# Patient Record
Sex: FEMALE | Race: WHITE | NOT HISPANIC OR LATINO | Employment: UNEMPLOYED | URBAN - METROPOLITAN AREA
[De-identification: names, ages, dates, MRNs, and addresses within clinical notes are randomized per-mention and may not be internally consistent; named-entity substitution may affect disease eponyms.]

---

## 2022-11-18 ENCOUNTER — OFFICE VISIT (OUTPATIENT)
Dept: ENDOCRINOLOGY | Facility: HOSPITAL | Age: 32
End: 2022-11-18

## 2022-11-18 ENCOUNTER — APPOINTMENT (OUTPATIENT)
Dept: LAB | Facility: HOSPITAL | Age: 32
End: 2022-11-18

## 2022-11-18 VITALS
HEART RATE: 96 BPM | HEIGHT: 63 IN | WEIGHT: 134 LBS | BODY MASS INDEX: 23.74 KG/M2 | DIASTOLIC BLOOD PRESSURE: 80 MMHG | SYSTOLIC BLOOD PRESSURE: 120 MMHG

## 2022-11-18 DIAGNOSIS — E10.65 TYPE 1 DIABETES MELLITUS WITH HYPERGLYCEMIA (HCC): Primary | ICD-10-CM

## 2022-11-18 PROBLEM — R80.9 URINE TEST POSITIVE FOR MICROALBUMINURIA: Status: ACTIVE | Noted: 2022-11-18

## 2022-11-18 LAB
ALBUMIN SERPL BCP-MCNC: 4.4 G/DL (ref 3.5–5)
ALP SERPL-CCNC: 52 U/L (ref 46–116)
ALT SERPL W P-5'-P-CCNC: 17 U/L (ref 12–78)
ANION GAP SERPL CALCULATED.3IONS-SCNC: 3 MMOL/L (ref 4–13)
AST SERPL W P-5'-P-CCNC: 11 U/L (ref 5–45)
BASOPHILS # BLD AUTO: 0.03 THOUSANDS/ÂΜL (ref 0–0.1)
BASOPHILS NFR BLD AUTO: 1 % (ref 0–1)
BILIRUB SERPL-MCNC: 0.94 MG/DL (ref 0.2–1)
BUN SERPL-MCNC: 8 MG/DL (ref 5–25)
CALCIUM SERPL-MCNC: 9.6 MG/DL (ref 8.3–10.1)
CHLORIDE SERPL-SCNC: 104 MMOL/L (ref 96–108)
CHOLEST SERPL-MCNC: 194 MG/DL
CO2 SERPL-SCNC: 29 MMOL/L (ref 21–32)
CREAT SERPL-MCNC: 0.53 MG/DL (ref 0.6–1.3)
EOSINOPHIL # BLD AUTO: 0.06 THOUSAND/ÂΜL (ref 0–0.61)
EOSINOPHIL NFR BLD AUTO: 1 % (ref 0–6)
ERYTHROCYTE [DISTWIDTH] IN BLOOD BY AUTOMATED COUNT: 13 % (ref 11.6–15.1)
GFR SERPL CREATININE-BSD FRML MDRD: 127 ML/MIN/1.73SQ M
GLUCOSE P FAST SERPL-MCNC: 82 MG/DL (ref 65–99)
HCT VFR BLD AUTO: 42.6 % (ref 34.8–46.1)
HDLC SERPL-MCNC: 89 MG/DL
HGB BLD-MCNC: 13.6 G/DL (ref 11.5–15.4)
IMM GRANULOCYTES # BLD AUTO: 0.01 THOUSAND/UL (ref 0–0.2)
IMM GRANULOCYTES NFR BLD AUTO: 0 % (ref 0–2)
LDLC SERPL CALC-MCNC: 93 MG/DL (ref 0–100)
LYMPHOCYTES # BLD AUTO: 1.59 THOUSANDS/ÂΜL (ref 0.6–4.47)
LYMPHOCYTES NFR BLD AUTO: 31 % (ref 14–44)
MCH RBC QN AUTO: 30.4 PG (ref 26.8–34.3)
MCHC RBC AUTO-ENTMCNC: 31.9 G/DL (ref 31.4–37.4)
MCV RBC AUTO: 95 FL (ref 82–98)
MONOCYTES # BLD AUTO: 0.37 THOUSAND/ÂΜL (ref 0.17–1.22)
MONOCYTES NFR BLD AUTO: 7 % (ref 4–12)
NEUTROPHILS # BLD AUTO: 3.08 THOUSANDS/ÂΜL (ref 1.85–7.62)
NEUTS SEG NFR BLD AUTO: 60 % (ref 43–75)
NRBC BLD AUTO-RTO: 0 /100 WBCS
PLATELET # BLD AUTO: 310 THOUSANDS/UL (ref 149–390)
PMV BLD AUTO: 10.2 FL (ref 8.9–12.7)
POTASSIUM SERPL-SCNC: 3.7 MMOL/L (ref 3.5–5.3)
PROT SERPL-MCNC: 8.1 G/DL (ref 6.4–8.4)
RBC # BLD AUTO: 4.47 MILLION/UL (ref 3.81–5.12)
SODIUM SERPL-SCNC: 136 MMOL/L (ref 135–147)
TRIGL SERPL-MCNC: 58 MG/DL
TSH SERPL DL<=0.05 MIU/L-ACNC: 1.37 UIU/ML (ref 0.45–4.5)
WBC # BLD AUTO: 5.14 THOUSAND/UL (ref 4.31–10.16)

## 2022-11-18 RX ORDER — CHLORAL HYDRATE 500 MG
CAPSULE ORAL
COMMUNITY

## 2022-11-18 RX ORDER — INSULIN GLARGINE 100 [IU]/ML
12 INJECTION, SOLUTION SUBCUTANEOUS
Qty: 15 ML | Refills: 3 | Status: SHIPPED | OUTPATIENT
Start: 2022-11-18

## 2022-11-18 RX ORDER — LANCETS 30 GAUGE
EACH MISCELLANEOUS
Qty: 800 EACH | Refills: 3 | Status: SHIPPED | OUTPATIENT
Start: 2022-11-18

## 2022-11-18 RX ORDER — BLOOD SUGAR DIAGNOSTIC
STRIP MISCELLANEOUS
Qty: 800 STRIP | Refills: 3 | Status: SHIPPED | OUTPATIENT
Start: 2022-11-18

## 2022-11-18 RX ORDER — INSULIN LISPRO 100 [IU]/ML
INJECTION, SOLUTION INTRAVENOUS; SUBCUTANEOUS
COMMUNITY
Start: 2003-09-17 | End: 2022-11-18 | Stop reason: SDUPTHER

## 2022-11-18 RX ORDER — PEN NEEDLE, DIABETIC 31 GX5/16"
NEEDLE, DISPOSABLE MISCELLANEOUS
Qty: 500 EACH | Refills: 3 | Status: SHIPPED | OUTPATIENT
Start: 2022-11-18

## 2022-11-18 RX ORDER — INSULIN GLARGINE 100 [IU]/ML
12 INJECTION, SOLUTION SUBCUTANEOUS
COMMUNITY
Start: 2003-09-17 | End: 2022-11-18 | Stop reason: SDUPTHER

## 2022-11-18 RX ORDER — INSULIN LISPRO 100 [IU]/ML
INJECTION, SOLUTION INTRAVENOUS; SUBCUTANEOUS
Qty: 30 ML | Refills: 3 | Status: SHIPPED | OUTPATIENT
Start: 2022-11-18

## 2022-11-18 NOTE — PROGRESS NOTES
11/19/2022    Assessment/Plan      Diagnoses and all orders for this visit:    Type 1 diabetes mellitus with hyperglycemia (HCC)  -     HEMOGLOBIN A1C W/ EAG ESTIMATION Lab Collect  -     CBC and differential Lab Collect  -     Comprehensive metabolic panel Lab Collect  -     Lipid Panel with Direct LDL reflex Lab Collect  -     TSH, 3rd generation Lab Collect  -     HEMOGLOBIN A1C W/ EAG ESTIMATION Lab Collect; Future  -     Comprehensive metabolic panel Lab Collect; Future  -     Microalbumin / creatinine urine ratio Lab Collect; Future  -     Insulin Glargine Solostar 100 UNIT/ML SOPN; Inject 0 12 mL (12 Units total) under the skin daily at bedtime  -     insulin lispro (HumaLOG) 100 units/mL injection; On carb ratio, up to 30 units daily  -     Insulin Pen Needle (B-D UF III MINI PEN NEEDLES) 31G X 5 MM MISC; Use 5 times a day  -     glucose blood (OneTouch Verio) test strip; Use to test up to 8 times a day  -     OneTouch Delica Lancets 60E MISC; Use  8 times a day    Other orders  -     Discontinue: insulin lispro (HumaLOG) 100 units/mL injection; On carb ratio  -     Discontinue: Insulin Glargine Solostar 100 UNIT/ML SOPN; Inject 12 Units under the skin daily at bedtime  -     CINNAMON PO; Take by mouth in the morning  -     APPLE CIDER VINEGAR PO; Take by mouth 2 (two) times a day  -     Probiotic Product (PROBIOTIC ADVANCED PO); Take by mouth in the morning  -     Prenatal Vit-DSS-Fe Cbn-FA (PRENATAL AD PO); Take by mouth in the morning  -     Omega-3 1000 MG CAPS; Take by mouth Prn daily  -     INOSITOL-5 PO; Take by mouth in the morning  -     MISC NATURAL PRODUCTS EX; Apply topically Progesterone cream days near menses  Assessment/Plan:  Type 1 diabetes  I have no recent blood work since March 2022  At that time, her hemoglobin A1c was quite good in the 6s  For now, she will continue the same Lantus and Humalog insulin dosages  I will have her get her blood work done now    I have asked her to make sure she tries to eat something around lunchtime as this will help keep her blood sugars from rising throughout the day  She will continue to test her blood sugars at least 6 times a day  She has a Dexcom G6 and I have asked her to consider using it as she will need it if she becomes pregnant  I have asked her to follow up with the ophthalmologist as she is overdue for diabetic eye exam   She is considering trying to conceive a pregnancy in the next several months and I have asked her to call if this occurs as she will need to be followed by maternal fetal medicine for her diabetes during pregnancy  I have asked her to get blood work done now consisting of a hemoglobin A1c, CMP, CBC, lipid panel, and TSH  I have asked her to follow up in 6 months with preceding hemoglobin A1c, CMP, and urine microalbumin to creatinine ratio  CC: Diabetes Consult    History of Present Illness     HPI: Librado Hendrix is a 29y o  year old female with type 1 diabetes for 20 years for evaluation/consult  She was diagnosed at the age of 10 or 8 years when she developed polyuria and polydipsia  Blood sugar was checked and was in the 200s  She had had a lot of strep throat prior to that as a child  She has never had DKA  She has had a transient hemoglobin A1c in the 8 in adulthood but most of the time her hemoglobin A1cs have been quite good  She lives in [place] and recently moved from [place] and is here for transfer of care  She is on insulin at home and takes Lantus insulin 12 units at bedtime and Humalog insulin 1 unit per 7 g carbohydrate with each meal and extra 1 unit for every 40 blood sugar points above 120-180 depending on her activity  She denies any polyphagia, polydipsia, and blurry vision  She is chronic polyuria and once or twice a night nocturia  She denies numbness or tingling of the feet  She denies chest pain or shortness of breath    She denies neuropathy, nephropathy, retinopathy, heart attack, stroke and claudication but does admit to none  She has been to Diabetes Education in the past   She is not interested in having an insulin pump as she does not want anything attached to her  She has a Dexcom G6 that she is not yet started using  She is hoping to become pregnant in the near future  Hypoglycemic episodes: Yes daily  May be 0-2 times a day but on most days  Usually overnight  H/o of hypoglycemia causing hospitalization or intervention such as glucagon injection  or ambulance call  No   Hypoglycemia symptoms: jitteriness and sweating  Treatment of hypoglycemia: fruit snacks  Glucagon:Yes  Medic alert tag: recommended,Yes  The patient's last eye exam was in July 2021 with perhaps 1 hemorrhage and slight retinopathy  The patient's last foot exam was not recently  Last A1C was   Lab Results   Component Value Date    HGBA1C 6 6 (H) 11/18/2022     Blood Sugar/Glucometer/Pump/CGM review:  She checks her blood sugars at least 6 or more times a day  She sometimes checks up to 10 times a day  She will notice a spike of blood sugars in the late morning if she eats nothing for breakfast   Sugars can go up to 300 from 80  Blood sugars seem to be the highest in the mid afternoon especially if she does not eat lunch  She did not have a blood sugar record with her but reports her 7 day average was 173 although she would just gotten back from vacation  Her 14 day average of blood sugars is 154 and her 30 day average of blood sugars is 151  Review of Systems   Constitutional: Negative for fatigue and unexpected weight change  Weight up a little with the [place] pandemic  HENT: Negative for hearing loss, tinnitus and trouble swallowing  No XRT to the head or neck in the past    Eyes: Negative for visual disturbance  Respiratory: Negative for chest tightness and shortness of breath      Cardiovascular: Negative for chest pain, palpitations and leg swelling  Gastrointestinal: Positive for constipation  Negative for abdominal pain, diarrhea and nausea  Occasional constipation  Was in ED once in the past     Endocrine: Positive for polyuria  Negative for polydipsia and polyphagia  Chronic polyuria  Nocturia once or twice a night  Genitourinary:        Irregular menses  Occurs every 1-2 months  No galactorrhea  History of microabuminuria in the past resolved on own  Musculoskeletal: Negative for arthralgias and back pain  Skin: Negative for wound  Neurological: Negative for dizziness, tremors, weakness, light-headedness, numbness and headaches  Psychiatric/Behavioral: Negative for dysphoric mood and sleep disturbance  The patient is not nervous/anxious  Historical Information   History reviewed  No pertinent past medical history    Past Surgical History:   Procedure Laterality Date   • WISDOM TOOTH EXTRACTION  2010     Social History   Social History     Substance and Sexual Activity   Alcohol Use Not Currently     Social History     Substance and Sexual Activity   Drug Use Never     Social History     Tobacco Use   Smoking Status Never   Smokeless Tobacco Never     Family History:   Family History   Problem Relation Age of Onset   • Hashimoto's thyroiditis Mother    • Hypertension Father    • Diabetes type I Cousin        Meds/Allergies   Current Outpatient Medications   Medication Sig Dispense Refill   • APPLE CIDER VINEGAR PO Take by mouth 2 (two) times a day     • CINNAMON PO Take by mouth in the morning     • glucose blood (OneTouch Verio) test strip Use to test up to 8 times a day 800 strip 3   • INOSITOL-5 PO Take by mouth in the morning     • Insulin Glargine Solostar 100 UNIT/ML SOPN Inject 0 12 mL (12 Units total) under the skin daily at bedtime 15 mL 3   • insulin lispro (HumaLOG) 100 units/mL injection On carb ratio, up to 30 units daily 30 mL 3   • Insulin Pen Needle (B-D UF III MINI PEN NEEDLES) 31G X 5 MM MISC Use 5 times a day 500 each 3   • MISC NATURAL PRODUCTS EX Apply topically Progesterone cream days near menses  • Omega-3 1000 MG CAPS Take by mouth Prn daily     • OneTouch Delica Lancets 64Q MISC Use  8 times a day 800 each 3   • Prenatal Vit-DSS-Fe Cbn-FA (PRENATAL AD PO) Take by mouth in the morning     • Probiotic Product (PROBIOTIC ADVANCED PO) Take by mouth in the morning       No current facility-administered medications for this visit  Allergies   Allergen Reactions   • Penicillins Hives and Rash       Objective   Vitals: Blood pressure 120/80, pulse 96, height 5' 3" (1 6 m), weight 60 8 kg (134 lb)  Invasive Devices     None                 Physical Exam  Vitals reviewed  Constitutional:       Appearance: Normal appearance  She is well-developed, normal weight and well-nourished  HENT:      Head: Normocephalic and atraumatic  Eyes:      Extraocular Movements: Extraocular movements intact and EOM normal       Conjunctiva/sclera: Conjunctivae normal       Comments: No lid lag, stare, proptosis, or periorbital edema  Neck:      Thyroid: No thyromegaly  Vascular: No carotid bruit  Comments: Thyroid normal in size  No palpable thyroid nodules  No bruits over the thyroid gland  Cardiovascular:      Rate and Rhythm: Normal rate and regular rhythm  Pulses: Normal pulses and intact distal pulses  no weak pulses          Dorsalis pedis pulses are 2+ on the right side and 2+ on the left side  Posterior tibial pulses are 2+ on the right side and 2+ on the left side  Heart sounds: Normal heart sounds  No murmur heard  Pulmonary:      Effort: Pulmonary effort is normal       Breath sounds: Normal breath sounds  No wheezing  Abdominal:      General: Bowel sounds are normal       Palpations: Abdomen is soft  Tenderness: There is no abdominal tenderness  Musculoskeletal:         General: No deformity or edema  Normal range of motion        Cervical back: Normal range of motion and neck supple  Right lower leg: No edema  Left lower leg: No edema  Comments: Callus medial 1st toe  No ulcerations of the feet  No tremor of the outstretched hands  No spinous process tenderness  No CVA tenderness  Feet:      Right foot:      Skin integrity: Callus present  No ulcer, skin breakdown, erythema, warmth or dry skin  Left foot:      Skin integrity: Callus present  No ulcer, skin breakdown, erythema, warmth or dry skin  Lymphadenopathy:      Cervical: No cervical adenopathy  Skin:     General: Skin is warm and dry  Findings: No rash  Neurological:      Mental Status: She is alert and oriented to person, place, and time  Deep Tendon Reflexes: Reflexes are normal and symmetric  Comments: Vibration sensation intact to the 1st toe DIP joint bilaterally  Microfilament sensation intact bilaterally  achilles tendon reflexes intact  Patient's shoes and socks removed  Right Foot/Ankle   Right Foot Inspection  Skin Exam: skin normal, skin intact, callus and callus  No dry skin, no warmth, no erythema, no maceration, no abnormal color, no pre-ulcer and no ulcer  Toe Exam: ROM and strength within normal limits  No swelling and  no right toe deformity    Sensory   Vibration: intact  Monofilament testing: intact    Vascular  Capillary refills: < 3 seconds  The right DP pulse is 2+  The right PT pulse is 2+  Left Foot/Ankle  Left Foot Inspection  Skin Exam: skin normal, skin intact and callus  No dry skin, no warmth, no erythema, no maceration, normal color, no pre-ulcer and no ulcer  Toe Exam: ROM and strength within normal limits  No swelling and no left toe deformity  Sensory   Vibration: intact  Monofilament testing: intact    Vascular  Capillary refills: < 3 seconds  The left DP pulse is 2+  The left PT pulse is 2+       Assign Risk Category  No deformity present  No loss of protective sensation  No weak pulses  Risk: 0        The history was obtained from the review of the chart and from the patient  Lab Results: At the time of the visit, I had no blood work results       Most recent Alc is  Lab Results   Component Value Date    HGBA1C 6 6 (H) 11/18/2022                 Future Appointments   Date Time Provider Beverly Byrne   5/19/2023 10:20 AM Miriam Samuel MD [facility]

## 2022-11-18 NOTE — PATIENT INSTRUCTIONS
We'll get blood work done now  Continue he same insulin for now  Try to make sure to eat something around lunch time  Continue to test blood sugars 6 or more times a day  Consider using the dexcom  Eye doctor visit  We'll refer you to maternal fetal medicine/high risk pregnancy when you are pregnant to control your diabetes in pregnancy  Follow up in 6 months with blood work

## 2022-11-19 LAB
EST. AVERAGE GLUCOSE BLD GHB EST-MCNC: 143 MG/DL
HBA1C MFR BLD: 6.6 %

## 2022-12-30 DIAGNOSIS — E10.65 TYPE 1 DIABETES MELLITUS WITH HYPERGLYCEMIA (HCC): Primary | ICD-10-CM

## 2022-12-30 RX ORDER — INSULIN GLARGINE 100 [IU]/ML
12 INJECTION, SOLUTION SUBCUTANEOUS
Qty: 15 ML | Refills: 2 | Status: SHIPPED | OUTPATIENT
Start: 2022-12-30

## 2023-01-25 DIAGNOSIS — E10.65 TYPE 1 DIABETES MELLITUS WITH HYPERGLYCEMIA (HCC): ICD-10-CM

## 2023-01-25 RX ORDER — PEN NEEDLE, DIABETIC 31 GX5/16"
NEEDLE, DISPOSABLE MISCELLANEOUS
Qty: 500 EACH | Refills: 3 | Status: SHIPPED | OUTPATIENT
Start: 2023-01-25 | End: 2023-01-26 | Stop reason: SDUPTHER

## 2023-01-25 RX ORDER — INSULIN GLARGINE 100 [IU]/ML
12 INJECTION, SOLUTION SUBCUTANEOUS
Qty: 15 ML | Refills: 1 | Status: SHIPPED | OUTPATIENT
Start: 2023-01-25 | End: 2023-01-26 | Stop reason: SDUPTHER

## 2023-01-25 RX ORDER — INSULIN LISPRO 100 [IU]/ML
INJECTION, SOLUTION INTRAVENOUS; SUBCUTANEOUS
Qty: 30 ML | Refills: 1 | Status: SHIPPED | OUTPATIENT
Start: 2023-01-25 | End: 2023-01-26 | Stop reason: SDUPTHER

## 2023-01-25 RX ORDER — LANCETS 30 GAUGE
EACH MISCELLANEOUS
Qty: 800 EACH | Refills: 3 | Status: SHIPPED | OUTPATIENT
Start: 2023-01-25 | End: 2023-01-26 | Stop reason: SDUPTHER

## 2023-01-25 RX ORDER — BLOOD SUGAR DIAGNOSTIC
STRIP MISCELLANEOUS
Qty: 800 STRIP | Refills: 3 | Status: SHIPPED | OUTPATIENT
Start: 2023-01-25 | End: 2023-01-26 | Stop reason: SDUPTHER

## 2023-01-26 DIAGNOSIS — E10.65 TYPE 1 DIABETES MELLITUS WITH HYPERGLYCEMIA (HCC): Primary | ICD-10-CM

## 2023-01-26 DIAGNOSIS — E10.65 TYPE 1 DIABETES MELLITUS WITH HYPERGLYCEMIA (HCC): ICD-10-CM

## 2023-01-26 RX ORDER — BLOOD SUGAR DIAGNOSTIC
STRIP MISCELLANEOUS
Qty: 800 STRIP | Refills: 3 | Status: SHIPPED | OUTPATIENT
Start: 2023-01-26 | End: 2023-01-27 | Stop reason: SDUPTHER

## 2023-01-26 RX ORDER — INSULIN LISPRO 100 [IU]/ML
INJECTION, SOLUTION INTRAVENOUS; SUBCUTANEOUS
Qty: 10 ML | Refills: 0 | Status: SHIPPED | OUTPATIENT
Start: 2023-01-26 | End: 2023-01-26 | Stop reason: SDUPTHER

## 2023-01-26 RX ORDER — PEN NEEDLE, DIABETIC 31 GX5/16"
NEEDLE, DISPOSABLE MISCELLANEOUS
Qty: 500 EACH | Refills: 3 | Status: SHIPPED | OUTPATIENT
Start: 2023-01-26 | End: 2023-01-27 | Stop reason: SDUPTHER

## 2023-01-26 RX ORDER — INSULIN LISPRO 100 [IU]/ML
INJECTION, SOLUTION INTRAVENOUS; SUBCUTANEOUS
Qty: 30 ML | Refills: 1 | Status: SHIPPED | OUTPATIENT
Start: 2023-01-26 | End: 2023-01-27 | Stop reason: SDUPTHER

## 2023-01-26 RX ORDER — LANCETS 30 GAUGE
EACH MISCELLANEOUS
Qty: 800 EACH | Refills: 3 | Status: SHIPPED | OUTPATIENT
Start: 2023-01-26 | End: 2023-01-27 | Stop reason: SDUPTHER

## 2023-01-26 RX ORDER — INSULIN GLARGINE 100 [IU]/ML
12 INJECTION, SOLUTION SUBCUTANEOUS
Qty: 15 ML | Refills: 0 | Status: SHIPPED | OUTPATIENT
Start: 2023-01-26 | End: 2023-01-26 | Stop reason: SDUPTHER

## 2023-01-26 RX ORDER — INSULIN GLARGINE 100 [IU]/ML
12 INJECTION, SOLUTION SUBCUTANEOUS
Qty: 15 ML | Refills: 1 | Status: SHIPPED | OUTPATIENT
Start: 2023-01-26 | End: 2023-01-27 | Stop reason: SDUPTHER

## 2023-01-26 RX ORDER — BLOOD SUGAR DIAGNOSTIC
STRIP MISCELLANEOUS
Qty: 250 STRIP | Refills: 0 | Status: SHIPPED | OUTPATIENT
Start: 2023-01-26 | End: 2023-01-26 | Stop reason: SDUPTHER

## 2023-01-26 RX ORDER — PEN NEEDLE, DIABETIC 31 GX5/16"
NEEDLE, DISPOSABLE MISCELLANEOUS
Qty: 150 EACH | Refills: 0 | Status: SHIPPED | OUTPATIENT
Start: 2023-01-26 | End: 2023-01-26 | Stop reason: SDUPTHER

## 2023-01-26 RX ORDER — LANCETS 30 GAUGE
EACH MISCELLANEOUS
Qty: 300 EACH | Refills: 0 | Status: SHIPPED | OUTPATIENT
Start: 2023-01-26 | End: 2023-01-26 | Stop reason: SDUPTHER

## 2023-01-26 NOTE — TELEPHONE ENCOUNTER
Representative from patient's insurance called to verify that her scripts were sent to the mail order pharmacy  They were sent but the pharmacy never received them  She asked that we do printed scripts for patient and send to mail order again so patient isn't without her medication  Patient lives in [place] and not sure if she wants to come  the scripts  Spoke to patient and she wants us to send a 30 day to her local pharmacy and again to mail order

## 2023-01-27 DIAGNOSIS — E10.65 TYPE 1 DIABETES MELLITUS WITH HYPERGLYCEMIA (HCC): ICD-10-CM

## 2023-01-27 RX ORDER — LANCETS 30 GAUGE
EACH MISCELLANEOUS
Qty: 800 EACH | Refills: 3 | Status: SHIPPED | OUTPATIENT
Start: 2023-01-27

## 2023-01-27 RX ORDER — BLOOD SUGAR DIAGNOSTIC
STRIP MISCELLANEOUS
Qty: 800 STRIP | Refills: 3 | Status: SHIPPED | OUTPATIENT
Start: 2023-01-27

## 2023-01-27 RX ORDER — INSULIN GLARGINE 100 [IU]/ML
12 INJECTION, SOLUTION SUBCUTANEOUS
Qty: 15 ML | Refills: 1 | Status: SHIPPED | OUTPATIENT
Start: 2023-01-27

## 2023-01-27 RX ORDER — PEN NEEDLE, DIABETIC 31 GX5/16"
NEEDLE, DISPOSABLE MISCELLANEOUS
Qty: 500 EACH | Refills: 3 | Status: SHIPPED | OUTPATIENT
Start: 2023-01-27

## 2023-01-27 RX ORDER — INSULIN LISPRO 100 [IU]/ML
INJECTION, SOLUTION INTRAVENOUS; SUBCUTANEOUS
Qty: 30 ML | Refills: 1 | Status: SHIPPED | OUTPATIENT
Start: 2023-01-27 | End: 2023-02-03 | Stop reason: ALTCHOICE

## 2023-02-03 DIAGNOSIS — E10.65 TYPE 1 DIABETES MELLITUS WITH HYPERGLYCEMIA (HCC): Primary | ICD-10-CM

## 2023-02-03 RX ORDER — INSULIN LISPRO 100 [IU]/ML
INJECTION, SOLUTION INTRAVENOUS; SUBCUTANEOUS
Qty: 30 ML | Refills: 1 | Status: SHIPPED | OUTPATIENT
Start: 2023-02-03

## 2023-02-16 DIAGNOSIS — E10.65 TYPE 1 DIABETES MELLITUS WITH HYPERGLYCEMIA (HCC): ICD-10-CM

## 2023-02-16 RX ORDER — INSULIN GLARGINE 100 [IU]/ML
12 INJECTION, SOLUTION SUBCUTANEOUS
Qty: 15 ML | Refills: 1 | Status: SHIPPED | OUTPATIENT
Start: 2023-02-16

## 2023-03-01 ENCOUNTER — TELEPHONE (OUTPATIENT)
Dept: ENDOCRINOLOGY | Facility: HOSPITAL | Age: 33
End: 2023-03-01

## 2023-03-01 NOTE — TELEPHONE ENCOUNTER
The patient's pharmacy called and stated that the Humalog prescription that was sent to them was for the vials and they were wondering if they could just change that to the pens or if you would be willing to send over a new prescription for them

## 2023-03-01 NOTE — TELEPHONE ENCOUNTER
The patient was able to call me back and she is using this pharmacy for her prescriptions for her insulin at this time    If it is ok I can call them and give them to change the script to the pens or did you want to send a new one over

## 2023-03-02 NOTE — TELEPHONE ENCOUNTER
I was able to call [phone] back and I spoke with the pharmacist Alan Bush to make her aware the prescription may be changed from vials to pens per Dr Camacho Serrano  She was able to retract the vial prescription and fill for the pens for the patient

## 2023-05-01 ENCOUNTER — TELEPHONE (OUTPATIENT)
Dept: ENDOCRINOLOGY | Facility: HOSPITAL | Age: 33
End: 2023-05-01

## 2023-05-02 ENCOUNTER — APPOINTMENT (OUTPATIENT)
Dept: LAB | Facility: HOSPITAL | Age: 33
End: 2023-05-02

## 2023-05-02 DIAGNOSIS — E10.65 TYPE 1 DIABETES MELLITUS WITH HYPERGLYCEMIA (HCC): ICD-10-CM

## 2023-05-02 LAB
CREAT UR-MCNC: <13 MG/DL
MICROALBUMIN UR-MCNC: <5 MG/L (ref 0–20)

## 2023-05-03 LAB
ALBUMIN SERPL BCP-MCNC: 4.1 G/DL (ref 3.5–5)
ALP SERPL-CCNC: 39 U/L (ref 46–116)
ALT SERPL W P-5'-P-CCNC: 18 U/L (ref 12–78)
ANION GAP SERPL CALCULATED.3IONS-SCNC: 6 MMOL/L (ref 4–13)
AST SERPL W P-5'-P-CCNC: 6 U/L (ref 5–45)
BILIRUB SERPL-MCNC: 0.34 MG/DL (ref 0.2–1)
BUN SERPL-MCNC: 7 MG/DL (ref 5–25)
CALCIUM SERPL-MCNC: 9.1 MG/DL (ref 8.3–10.1)
CHLORIDE SERPL-SCNC: 110 MMOL/L (ref 96–108)
CO2 SERPL-SCNC: 21 MMOL/L (ref 21–32)
CREAT SERPL-MCNC: 0.56 MG/DL (ref 0.6–1.3)
GFR SERPL CREATININE-BSD FRML MDRD: 125 ML/MIN/1.73SQ M
GLUCOSE P FAST SERPL-MCNC: 81 MG/DL (ref 65–99)
POTASSIUM SERPL-SCNC: 4 MMOL/L (ref 3.5–5.3)
PROT SERPL-MCNC: 7.4 G/DL (ref 6.4–8.4)
SODIUM SERPL-SCNC: 137 MMOL/L (ref 135–147)

## 2023-05-04 LAB
EST. AVERAGE GLUCOSE BLD GHB EST-MCNC: 157 MG/DL
HBA1C MFR BLD: 7.1 %